# Patient Record
Sex: FEMALE | Race: WHITE | Employment: FULL TIME | URBAN - METROPOLITAN AREA
[De-identification: names, ages, dates, MRNs, and addresses within clinical notes are randomized per-mention and may not be internally consistent; named-entity substitution may affect disease eponyms.]

---

## 2021-04-06 ENCOUNTER — IMMUNIZATION (OUTPATIENT)
Dept: FAMILY MEDICINE | Facility: CLINIC | Age: 56
End: 2021-04-06
Payer: COMMERCIAL

## 2021-04-06 PROCEDURE — 0011A PR COVID VAC MODERNA 100 MCG/0.5 ML IM: CPT

## 2021-04-06 PROCEDURE — 91301 PR COVID VAC MODERNA 100 MCG/0.5 ML IM: CPT

## 2021-05-04 ENCOUNTER — IMMUNIZATION (OUTPATIENT)
Dept: FAMILY MEDICINE | Facility: CLINIC | Age: 56
End: 2021-05-04
Attending: FAMILY MEDICINE
Payer: COMMERCIAL

## 2021-05-04 PROCEDURE — 0012A PR COVID VAC MODERNA 100 MCG/0.5 ML IM: CPT

## 2021-05-04 PROCEDURE — 91301 PR COVID VAC MODERNA 100 MCG/0.5 ML IM: CPT

## 2024-03-22 ENCOUNTER — TRANSCRIBE ORDERS (OUTPATIENT)
Dept: OTHER | Age: 59
End: 2024-03-22

## 2024-03-22 DIAGNOSIS — Q25.1 ABDOMINAL AORTIC STENOSIS: Primary | ICD-10-CM

## 2024-03-25 DIAGNOSIS — I70.0 AORTIC ATHEROSCLEROSIS (H): Primary | ICD-10-CM

## 2024-03-27 ENCOUNTER — DOCUMENTATION ONLY (OUTPATIENT)
Dept: VASCULAR SURGERY | Facility: CLINIC | Age: 59
End: 2024-03-27
Payer: COMMERCIAL

## 2024-03-27 NOTE — PROGRESS NOTES
Action 3.27.24 jm   [facility]

## 2024-04-09 ENCOUNTER — TELEPHONE (OUTPATIENT)
Dept: VASCULAR SURGERY | Facility: CLINIC | Age: 59
End: 2024-04-09
Payer: COMMERCIAL

## 2024-04-09 DIAGNOSIS — I70.1 RENAL ARTERY STENOSIS (H): Primary | ICD-10-CM

## 2024-04-09 NOTE — TELEPHONE ENCOUNTER
----- Message from Patti Nunez RN sent at 4/3/2024  8:14 AM CDT -----  Hey guys    Can you please call this pt and cancel her CT that is scheduled prior to her appt with CP? She does not need a repeat CT scan as she recently had one done at an outside facility. Thanks!    Patti

## 2024-04-09 NOTE — TELEPHONE ENCOUNTER
Message left notifying pt that CT Scan scheduled on 4/22/24 is not needed and will be cancelled. The pt does not have Mycahrt.  Iggy Andrew on 4/9/2024 at 12:58 PM

## 2024-04-09 NOTE — TELEPHONE ENCOUNTER
The pt called back the CT Scan has been cancelled that was scheduled on 4/22/24 and the pt requested to be seen sooner on 4/22/24 and is now scheduled at 2:00 pm with Dr. Pandya.  Iggy Andrew on 4/9/2024 at 2:06 PM

## 2024-04-22 ENCOUNTER — OFFICE VISIT (OUTPATIENT)
Dept: VASCULAR SURGERY | Facility: CLINIC | Age: 59
End: 2024-04-22
Payer: COMMERCIAL

## 2024-04-22 VITALS — OXYGEN SATURATION: 97 % | SYSTOLIC BLOOD PRESSURE: 172 MMHG | DIASTOLIC BLOOD PRESSURE: 86 MMHG | HEART RATE: 89 BPM

## 2024-04-22 DIAGNOSIS — E11.9 TYPE 2 DIABETES MELLITUS WITHOUT COMPLICATION, WITH LONG-TERM CURRENT USE OF INSULIN (H): ICD-10-CM

## 2024-04-22 DIAGNOSIS — F17.200 CURRENT SMOKER: ICD-10-CM

## 2024-04-22 DIAGNOSIS — Z79.4 TYPE 2 DIABETES MELLITUS WITHOUT COMPLICATION, WITH LONG-TERM CURRENT USE OF INSULIN (H): ICD-10-CM

## 2024-04-22 DIAGNOSIS — Q25.1 ABDOMINAL AORTIC STENOSIS: Primary | ICD-10-CM

## 2024-04-22 DIAGNOSIS — I10 BENIGN ESSENTIAL HYPERTENSION: ICD-10-CM

## 2024-04-22 DIAGNOSIS — E78.5 HYPERLIPIDEMIA, UNSPECIFIED HYPERLIPIDEMIA TYPE: ICD-10-CM

## 2024-04-22 PROCEDURE — 99406 BEHAV CHNG SMOKING 3-10 MIN: CPT | Mod: GC | Performed by: SURGERY

## 2024-04-22 PROCEDURE — 99205 OFFICE O/P NEW HI 60 MIN: CPT | Mod: 25 | Performed by: SURGERY

## 2024-04-22 RX ORDER — FLUOCINOLONE ACETONIDE 0.11 MG/ML
OIL AURICULAR (OTIC)
COMMUNITY

## 2024-04-22 RX ORDER — METFORMIN HCL 500 MG
TABLET, EXTENDED RELEASE 24 HR ORAL
COMMUNITY

## 2024-04-22 RX ORDER — BLOOD SUGAR DIAGNOSTIC
STRIP MISCELLANEOUS
COMMUNITY
Start: 2024-01-12

## 2024-04-22 RX ORDER — LISINOPRIL 40 MG/1
TABLET ORAL
COMMUNITY

## 2024-04-22 RX ORDER — PEN NEEDLE, DIABETIC 32GX 5/32"
NEEDLE, DISPOSABLE MISCELLANEOUS
COMMUNITY
Start: 2024-02-26

## 2024-04-22 RX ORDER — TIOTROPIUM BROMIDE INHALATION SPRAY 3.12 UG/1
2 SPRAY, METERED RESPIRATORY (INHALATION)
COMMUNITY
Start: 2024-02-11

## 2024-04-22 RX ORDER — FOLIC ACID/MV,IRON,MIN/LUTEIN 0.4-18-25
1 TABLET ORAL DAILY
COMMUNITY

## 2024-04-22 RX ORDER — HYDROCORTISONE 2.5 %
CREAM (GRAM) TOPICAL
COMMUNITY
Start: 2023-02-13

## 2024-04-22 RX ORDER — ROSUVASTATIN CALCIUM 20 MG/1
TABLET, COATED ORAL
COMMUNITY

## 2024-04-22 RX ORDER — INSULIN GLARGINE 100 [IU]/ML
INJECTION, SOLUTION SUBCUTANEOUS
COMMUNITY

## 2024-04-22 RX ORDER — LIRAGLUTIDE 6 MG/ML
INJECTION SUBCUTANEOUS
COMMUNITY

## 2024-04-22 RX ORDER — ALBUTEROL SULFATE 90 UG/1
2 AEROSOL, METERED RESPIRATORY (INHALATION)
COMMUNITY
Start: 2024-04-16

## 2024-04-22 RX ORDER — NICOTINE 21 MG/24HR
PATCH, TRANSDERMAL 24 HOURS TRANSDERMAL
COMMUNITY

## 2024-04-22 NOTE — LETTER
April 25, 2024      Lynette Argueta  [address] [address]      Dear Ms. Garcia and [facility],    I hope this letter finds you well. I wanted to take a moment to express my sincere gratitude for referring Ms. Argueta to me and [facility] Vascular Surgery team. I had the pleasure of seeing her today for her paravisceral coral reef aortic plaque.  At this time I believe she is asymptomatic without signs of renovascular hypertension, mesenteric ischemia, or lower extremity ischemia.  As such, she would not benefit from extensive aortic surgery and I would favor continued optimal medical therapy. I have strongly advised smoking cessation, which she is open to.    Upon evaluation, our vascular care plan is below:    Diagnosis: Asymptomatic paravisceral coral reef aortic plaque  Treatment Plan: Continued optimal medical therapy and active surveillance, smoking cessation  Medication changes: None  Follow-up Care: 6 months return visit with me with a repeat CTA, exercise KRISTIAN, and renal duplex.    Please do not hesitate to reach out if you have any questions or require further information regarding this mutual patient. We would be happy to send over any additional supporting documentation for your records.    Thank you once again for entrusting us with the care of Ms. Argueta.    Best regards,    Chris Pandya MD, RPVI   Vascular & Endovascular Surgery  [facility]  Cell phone: [phone]  Email: [email or web address]

## 2024-04-22 NOTE — Clinical Note
4/22/2024       RE: Lynette Argueta  [address] [id number]     Dear Colleague,    Thank you for referring your patient, Lynette Argueta, to the [facility] [place] [facility]. Please see a copy of my visit note below.    VASCULAR SURGERY CLINIC CONSULTATION   VASCULAR SURGEON: Dr. Pandya    LOCATION:  [facility]ASC[facility] [place]    Lynette Argueta  Medical Record #:  [id number]  YOB: 1965  Age:  58 year old     Date of Service: 4/22/2024    PRIMARY CARE PROVIDER: [place], Efraín Patel      Reason for visit:  Pararenal aortic stenosis    IMPRESSION & RECOMMENDATION:      #1 Asymptomatic high-grade abdominal aortic stenosis at the level of renal arteries extending distally 2 cm  #2 Well-controlled hypertension on single-agent antihypertensive regiment (lisinopril 40 mg)  #3 Active smoker with COPD, not on home O2  #4 Suboptimally controlled type 2 diabetes mellitus (Hgb A1C 7.3%)  #5 Hyperlipidemia  #6 Bilateral hip pain, likely arthritic hip pain    Ms. Argueta is a 58-year-old female seen in consultation today for second opinion regarding her high-grade abdominal aortic stenosis with coral reef plaque at the level of the renal arteries extending distally approximately 2 cm.  She appears to be largely asymptomatic from this as she is no evidence of endorgan malperfusion. She has no lower extremity rest pain, no buttock, thigh, or calf claudication, palpable pedal pulses.  Plaque about the level of the renal arteries however she does have normal kidney function and would not appear she has any significant component of renovascular hypertension as her blood pressure is well-controlled with a single antihypertensive agent.    We discussed the natural history of aortoocclusive disease and peripheral arterial disease and general approach to treatment.  Namely, optimal surgical  treatment of her abdominal aortic stenosis would require open surgical aortic endarterectomy. We discussed with the patient and her daughter that at the present time there is no indication for revascularization is she would appear to be asymptomatic.  He cannot say for certain, her description of pain with walking at the lateral hip joints bilaterally without buttock or thigh pain with appear most consistent with arthritic hip pain rather than buttock claudication.    We will plan for routine surveillance with a repeat CT angiogram abdomen pelvis approximately 6 months after the initial scan, placing her follow-up date in August 2024. Exercise ABIs as well as renal duplex ultrasound will also be completed. In the meantime she will continue to work towards optimal medical therapy with control of her diabetes, hypertension, continued statin and aspirin therapy. She understands she needs to quit smoking but remains pre-contemplative.    Plan  -Follow-up in August 2024, 6 months after initial CT scan.  -Prior to the appointment she will have a repeat CTA abdomen pelvis at the imaging center near her home.  Exercise ABIs and renal duplex ultrasound will be completed at the [facility] the day of her appointment.    -Continue aspirin, high-dose statin therapy     Patient was seen and discussed with staff, Dr. Pandya.    Ted Hutson MD      HPI:  Lynette Argueta is a 58 year old female who was seen today in consultation for aortic stenosis and is accompanied by her daughter who works in CT imaging. Ms. Argueta is a current smoker with COPD, hypertension, hyperlipidemia, suboptimally controlled type 2 diabetes, who was seen by an outside vascular surgeon regarding her abdominal aortic stenosis.  She tells us this segment of aortic stenosis was found incidentally on a routine chest CT for lung cancer screening several years ago.  She was eventually referred to vascular surgeon and underwent evaluation by Dr. Gallagher in  February of this year.  On review of Dr. Gallagher's clinic documentation, Ms. Argueta at that time denied any rest pain, lower extremity claudication, lower extremity wounds.  Today she confirms that she is generally without lower extremity pain however that she does have bilateral hip pain, mostly around the lateral hip joints, and on further elaboration she has no buttock claudication, thigh claudication or calf claudication.  She has no respiratory complaints and has no history of flash pulmonary edema.  Hypertension is well-controlled on 1 oral antihypertensive medication (lisinopril 40 mg) and has normal renal function. Hip pain does limit her ambulation and she feels she has been walking her dog less because of this.  She works in the mental health field.    REVIEW OF SYSTEMS:    A 12 point ROS was reviewed and except for what is listed in the HPI above, all others are negative    PHH:   Medical history:  COPD  Type 2 diabetes  Hypertension  Tobacco dependence and active smoking  Hyperlipidemia      ALLERGIES:    Allergies   Allergen Reactions    Chlorthalidone Dizziness    Pioglitazone Dizziness       MEDS:    Current Outpatient Medications:     ACCU-CHEK GUIDE test strip, Test 2 times per day, Disp: , Rfl:     albuterol (PROAIR HFA/PROVENTIL HFA/VENTOLIN HFA) 108 (90 Base) MCG/ACT inhaler, Inhale 2 puffs into the lungs, Disp: , Rfl:     BD BRITTANY U/F 32G X 4 MM insulin pen needle, USE 2 PER DAY. FOR ADMINISTERING VICTOZA AND LANTUS, DAILY*, Disp: , Rfl:     hydrocortisone 2.5 % cream, Apply topically to rash in ears twice daily until rash resolves. Then use as needed for flares., Disp: , Rfl:     SPIRIVA RESPIMAT 2.5 MCG/ACT inhaler, Inhale 2 puffs into the lungs, Disp: , Rfl:     CERTAVITE/ANTIOXIDANTS tablet, Take 1 tablet by mouth daily, Disp: , Rfl:     fluocinolone acetonide oil 0.01 % ear drops, Place 5 Drops into both ears two times daily*, Disp: , Rfl:     LANTUS SOLOSTAR 100 UNIT/ML soln, Inject 24 units  subcutaneous before bedtime.*, Disp: , Rfl:     lisinopril (ZESTRIL) 40 MG tablet, Take 1 Tablet (40 mg) by mouth once daily.*, Disp: , Rfl:     metFORMIN (GLUCOPHAGE XR) 500 MG 24 hr tablet, Take 4 Tablets (2,000 mg) by mouth once daily.*, Disp: , Rfl:     nicotine (NICODERM CQ) 14 MG/24HR 24 hr patch, APPLY 1-2 PATCHES TO CLEAN, DRY SKIN AND CHANGE DAILY*, Disp: , Rfl:     rosuvastatin (CRESTOR) 20 MG tablet, Take 1 Tablet (20 mg) by mouth once daily. Replaces simvastatin.*, Disp: , Rfl:     VICTOZA PEN 18 MG/3ML soln, Inject 0.3 mL (1.8 mg) subcutaneous once daily.*, Disp: , Rfl:     SOCIAL HABITS:   Social History    Substance and Sexual Activity      Alcohol use: Not on file      History   Drug Use Not on file      History   Smoking Status    Not on file   Smokeless Tobacco    Not on file        FAMILY HISTORY:  No family history on file.    PE:  BP (!) 172/86 (BP Location: Right arm, Patient Position: Sitting, Cuff Size: Adult Regular)   Pulse 89   SpO2 97%   Wt Readings from Last 1 Encounters:   No data found for Wt     There is no height or weight on file to calculate BMI.    EXAM:  Gen: no acute distress, sitting comfortably in exam chair  HEENT: Atraumatic, normocephalic  Neuro: Alert and oriented. No gross neurologic deficits   Pulm: nonlabored breathing on room air, no cough  CV: RRR by radial pulse, noncyanotic   ABD:soft, nontender, nondistended  MSK:  Normal active range of motion, no edema  Skin: Warm, dry, no rashes or abrasions on exposed skin  Psych: Normal affect, cooperative  Pulses: Palpable bilateral posterior tibial and dorsalis pedis pulses    DIAGNOSTIC STUDIES:     Images:  CT angiogram of the abdomen pelvis performed in [place] at [facility] on 2/12/2024 was reviewed.  Imaging is available in PACS as is the formal radiology report.  On my review she has approximately 70% stenosis with coral reef plaque of the aorta at the level of the renal arteries extending distally  approximately 2 cm.  Iliac system is widely patent bilaterally.    LABS:      Labs performed January 12, 2024  Hemoglobin A1c: 7.3%  Creatinine: 0.67  eGFR: >90    LDL 39, triglycerides 191 - 6/16/2023        Again, thank you for allowing me to participate in the care of your patient.      Sincerely,    Chris Pandya MD

## 2024-04-22 NOTE — PATIENT INSTRUCTIONS
Thank you so much for choosing us for your care. It was a pleasure to see you at the vascular clinic today.     Follow-up recommendations: We will see you back in 6 months virtually. Please do not hesitate to reach out to us in the meantime with any concerns. Our schedulers will get in touch with you to set up your follow-up visit.    Additional testing/imaging ordered today: CTA and renal US in 6 months (can be done near your home).      Our scheduling team will get in touch with you to set up any follow-up testing/imaging and/or appointments. Please be aware that any testing/imaging recommended today will need to completed prior to your next visit with the provider. If testing/imaging is not completed prior to your next visit, your visit may be rescheduled.     If you have any questions, please contact our clinic directly at [phone] and ask for the nurse. We also encourage the use of [email or web address] to communicate with your healthcare provider.    If you have an urgent need after business hours (8:00 am to 4:30 pm) please call [phone], option 4, and ask for the vascular attending on call. For non-urgent after hours needs, please call the vascular clinic at [phone]. For scheduling needs, please call our clinic directly at [phone].    =====================================================================    [facility] is recognized by the [facility] as a comprehensive stroke center. As part of our commitment to better patient outcomes and excellent stroke education, we attach the below stroke education materials to ALL of the after visit summaries in our vascular clinic.        Learning About BE FAST: Stroke Warning Signs  BE FAST is a simple way to remember the main symptoms of stroke. These symptoms happen suddenly. So learning what to look for helps you know when to call for medical help. BE FAST stands for:    B - Balance.  Loss of balance or trouble walking.     E -  Eyes.  Trouble seeing out of one or both eyes.     F - Face.  Weakness or drooping on one side of the face.     A - Arm.  Weakness or numbness in an arm or leg.     S - Speech.  Trouble speaking.     T - Time to call 911.  Also call 911 if you have other stroke symptoms. They include:  Sudden confusion.  Sudden trouble understanding simple statements.  Fainting.  A seizure.  A sudden, severe headache.     A stroke happens when a blood vessel in the brain bursts or is blocked by a blood clot. The blood supply to part of the brain--and the oxygen the blood carries--is reduced. This damages the brain.   If you have a stroke, quick treatment may save your life. And it may reduce the damage in your brain so that you have fewer problems after the stroke.   Current as of: December 18, 2022               Content Version: 13.8    4196-9510 [employer].   Care instructions adapted under license by your healthcare professional. If you have questions about a medical condition or this instruction, always ask your healthcare professional. [employer] disclaims any warranty or liability for your use of this information.    Learning About How to Prevent a Stroke  What is a stroke?  A stroke is damage to the brain that occurs when a blood vessel in the brain bursts or is blocked by a blood clot. Without blood and the oxygen it carries, part of the brain starts to die. The part of the body controlled by the damaged area of the brain can't work properly.  Brain damage can start within minutes of a stroke. But quick treatment can help limit the damage and increase the chance of a full recovery.  What puts you at risk for stroke?  A risk factor is anything that makes you more likely to have a particular health problem.  Risk factors for stroke that you can manage or change include:  Health problems like atrial fibrillation, diabetes, high blood pressure, high cholesterol, hardening of the arteries  (atherosclerosis), and sickle cell disease.  Smoking.  Drinking more than 2 alcoholic drinks a day for men and 1 drink a day for women.  Being overweight.  Not eating healthy foods.  Not getting enough physical activity.  Risk factors you can't change include:  Having a previous stroke.  Family history of stroke.  Being older.  Being , [place] Native, , or South  American.  Being female.  Having certain problems during pregnancy, such as preeclampsia.  Being past menopause.  Your doctor can help you know your risk. Then you and your doctor can talk about whether to take steps to lower it.  How can you help prevent a stroke?  Here are some things you can do to help prevent a stroke.  Manage health problems that raise your risk. These include atrial fibrillation, diabetes, high blood pressure, and high cholesterol.  Have a heart-healthy lifestyle.  Don't smoke. If you need help quitting, talk to your doctor about stop-smoking programs and medicines. These can increase your chances of quitting for good.  Limit alcohol to 2 drinks a day for men and 1 drink a day for women.  Stay at a healthy weight. Lose weight if you need to.  Be active. Get at least 30 minutes of exercise on most days of the week. Walking is a good choice. You also may want to do other activities, such as running, swimming, cycling, or playing tennis or team sports.  Eat heart-healthy foods. These include vegetables, fruits, nuts, beans, lean meat, fish, and whole grains. Limit sodium and sugar.  If you think you may have a problem with alcohol or drug use, talk to your doctor.  If you use hormone therapy for menopause or hormonal birth control, talk with your doctor. Ask if these are right for you. They may raise the risk of stroke in some people.  Decide with your doctor whether you will also take medicines to help lower your risk. For example, you and your doctor may decide you will take a medicine that prevents  blood clots.  What are the symptoms of a stroke?  Symptoms of a stroke happen quickly. A stroke may cause:  Sudden numbness, tingling, weakness, or loss of movement in your face, arm, or leg, especially on only one side of your body.  Sudden vision changes.  Sudden trouble speaking.  Sudden confusion or trouble understanding simple statements.  Sudden problems with walking or balance.  A sudden, severe headache that is different from past headaches.  Fainting.  A seizure.  It's important to call for medical help if you have stroke symptoms. Quick treatment may save your life. And it may reduce the damage in your brain so that you have fewer problems after the stroke.  Follow-up care is a key part of your treatment and safety. Be sure to make and go to all appointments, and call your doctor if you are having problems. It's also a good idea to know your test results and keep a list of the medicines you take.    Current as of: December 18, 2022               Content Version: 13.8    [id number] [employer].   Care instructions adapted under license by your healthcare professional. If you have questions about a medical condition or this instruction, always ask your healthcare professional. [employer] disclaims any warranty or liability for your use of this information.

## 2024-04-22 NOTE — PROGRESS NOTES
VASCULAR SURGERY CLINIC CONSULTATION   VASCULAR SURGEON: Dr. Pandya    LOCATION:  [facility]    Lynette Argueta  Medical Record #:  [id number]  YOB: 1965  Age:  58 year old     Date of Service: 4/22/2024    PRIMARY CARE PROVIDER: Alton, Efraín Patel      Reason for visit:  Pararenal aortic stenosis    IMPRESSION & RECOMMENDATION:      #1 Asymptomatic high-grade abdominal aortic stenosis at the level of renal arteries extending distally 2 cm  #2 Well-controlled hypertension on single-agent antihypertensive regiment (lisinopril 40 mg)  #3 Active smoker with COPD, not on home O2  #4 Suboptimally controlled type 2 diabetes mellitus (Hgb A1C 7.3%)  #5 Hyperlipidemia  #6 Bilateral hip pain, likely arthritic hip pain    Ms. Argueta is a 58-year-old female seen in consultation today for second opinion regarding her high-grade abdominal aortic stenosis with coral reef plaque at the level of the renal arteries extending distally approximately 2 cm.  She appears to be largely asymptomatic from this as she is no evidence of endorgan malperfusion. She has no lower extremity rest pain, no buttock, thigh, or calf claudication, palpable pedal pulses.  Plaque about the level of the renal arteries however she does have normal kidney function and would not appear she has any significant component of renovascular hypertension as her blood pressure is well-controlled with a single antihypertensive agent.    We discussed the natural history of aortoocclusive disease and peripheral arterial disease and general approach to treatment.  Namely, optimal surgical treatment of her abdominal aortic stenosis would require open surgical aortic endarterectomy. We discussed with the patient and her daughter that at the present time there is no indication for revascularization is she would appear to be asymptomatic.  He cannot say for certain, her description of pain with walking at the  lateral hip joints bilaterally without buttock or thigh pain with appear most consistent with arthritic hip pain rather than buttock claudication.    We will plan for routine surveillance with a repeat CT angiogram abdomen pelvis approximately 6 months after the initial scan, placing her follow-up date in August 2024. Exercise ABIs as well as renal duplex ultrasound will also be completed. In the meantime she will continue to work towards optimal medical therapy with control of her diabetes, hypertension, continued statin and aspirin therapy. She understands she needs to quit smoking but remains pre-contemplative.    Plan  -Follow-up in August 2024, 6 months after initial CT scan.  -Prior to the appointment she will have a repeat CTA abdomen pelvis at the imaging center near her home.  Exercise ABIs and renal duplex ultrasound will be completed at the [facility] the day of her appointment.    -Continue aspirin, high-dose statin therapy     Patient was seen and discussed with staff, Dr. Pandya.    Ted Hutson MD      HPI:  Lynette Argueta is a 58 year old female who was seen today in consultation for aortic stenosis and is accompanied by her daughter who works in CT imaging. Ms. Argueta is a current smoker with COPD, hypertension, hyperlipidemia, suboptimally controlled type 2 diabetes, who was seen by an outside vascular surgeon regarding her abdominal aortic stenosis.  She tells us this segment of aortic stenosis was found incidentally on a routine chest CT for lung cancer screening several years ago.  She was eventually referred to vascular surgeon and underwent evaluation by Dr. Gallagher in February of this year.  On review of Dr. Gallagher's clinic documentation, Ms. Argueta at that time denied any rest pain, lower extremity claudication, lower extremity wounds.  Today she confirms that she is generally without lower extremity pain however that she does have bilateral hip pain, mostly around the lateral hip joints,  and on further elaboration she has no buttock claudication, thigh claudication or calf claudication.  She has no respiratory complaints and has no history of flash pulmonary edema.  Hypertension is well-controlled on 1 oral antihypertensive medication (lisinopril 40 mg) and has normal renal function. Hip pain does limit her ambulation and she feels she has been walking her dog less because of this.  She works in the mental health field.    REVIEW OF SYSTEMS:    A 12 point ROS was reviewed and except for what is listed in the HPI above, all others are negative    PHH:   Medical history:  COPD  Type 2 diabetes  Hypertension  Tobacco dependence and active smoking  Hyperlipidemia      ALLERGIES:    Allergies   Allergen Reactions    Chlorthalidone Dizziness    Pioglitazone Dizziness       MEDS:    Current Outpatient Medications:     ACCU-CHEK GUIDE test strip, Test 2 times per day, Disp: , Rfl:     albuterol (PROAIR HFA/PROVENTIL HFA/VENTOLIN HFA) 108 (90 Base) MCG/ACT inhaler, Inhale 2 puffs into the lungs, Disp: , Rfl:     BD BRITTANY U/F 32G X 4 MM insulin pen needle, USE 2 PER DAY. FOR ADMINISTERING VICTOZA AND LANTUS, DAILY*, Disp: , Rfl:     hydrocortisone 2.5 % cream, Apply topically to rash in ears twice daily until rash resolves. Then use as needed for flares., Disp: , Rfl:     SPIRIVA RESPIMAT 2.5 MCG/ACT inhaler, Inhale 2 puffs into the lungs, Disp: , Rfl:     CERTAVITE/ANTIOXIDANTS tablet, Take 1 tablet by mouth daily, Disp: , Rfl:     fluocinolone acetonide oil 0.01 % ear drops, Place 5 Drops into both ears two times daily*, Disp: , Rfl:     LANTUS SOLOSTAR 100 UNIT/ML soln, Inject 24 units subcutaneous before bedtime.*, Disp: , Rfl:     lisinopril (ZESTRIL) 40 MG tablet, Take 1 Tablet (40 mg) by mouth once daily.*, Disp: , Rfl:     metFORMIN (GLUCOPHAGE XR) 500 MG 24 hr tablet, Take 4 Tablets (2,000 mg) by mouth once daily.*, Disp: , Rfl:     nicotine (NICODERM CQ) 14 MG/24HR 24 hr patch, APPLY 1-2 PATCHES TO  CLEAN, DRY SKIN AND CHANGE DAILY*, Disp: , Rfl:     rosuvastatin (CRESTOR) 20 MG tablet, Take 1 Tablet (20 mg) by mouth once daily. Replaces simvastatin.*, Disp: , Rfl:     VICTOZA PEN 18 MG/3ML soln, Inject 0.3 mL (1.8 mg) subcutaneous once daily.*, Disp: , Rfl:     SOCIAL HABITS:   Social History    Substance and Sexual Activity      Alcohol use: Not on file      History   Drug Use Not on file      History   Smoking Status    Not on file   Smokeless Tobacco    Not on file        FAMILY HISTORY:  No family history on file.    PE:  BP (!) 172/86 (BP Location: Right arm, Patient Position: Sitting, Cuff Size: Adult Regular)   Pulse 89   SpO2 97%   Wt Readings from Last 1 Encounters:   No data found for Wt     There is no height or weight on file to calculate BMI.    EXAM:  Gen: no acute distress, sitting comfortably in exam chair  HEENT: Atraumatic, normocephalic  Neuro: Alert and oriented. No gross neurologic deficits   Pulm: nonlabored breathing on room air, no cough  CV: RRR by radial pulse, noncyanotic   ABD:soft, nontender, nondistended  MSK:  Normal active range of motion, no edema  Skin: Warm, dry, no rashes or abrasions on exposed skin  Psych: Normal affect, cooperative  Pulses: Palpable bilateral posterior tibial and dorsalis pedis pulses    DIAGNOSTIC STUDIES:     Images:  CT angiogram of the abdomen pelvis [facility] [place] [facility]12/[facility]24 was reviewed.  Imaging is available in PACS as is the formal radiology report.  On my review she has approximately 70% stenosis with coral reef plaque of the aorta at the level of the renal arteries extending distally approximately 2 cm.  Iliac system is widely patent bilaterally.    LABS:      Labs performed January 12, 2024  Hemoglobin A1c: 7.3%  Creatinine: 0.67  eGFR: >90    LDL 39, triglycerides 191 - 6/16/2023

## 2024-04-25 RX ORDER — ASPIRIN 81 MG/1
81 TABLET ORAL DAILY
COMMUNITY

## 2024-07-27 ENCOUNTER — TELEPHONE (OUTPATIENT)
Dept: VASCULAR SURGERY | Facility: CLINIC | Age: 59
End: 2024-07-27
Payer: COMMERCIAL

## 2024-07-27 NOTE — TELEPHONE ENCOUNTER
Left Voicemail (1st Attempt) and Sent Artilleryt (1st Attempt) for the patient to call back and schedule the followin Month Follow up Pre-transplant evaluation for kidney transplant.    Location: [facility]  Provider:     Appointment type: Return Vascular Patient    Appointment mode: In Person visit   Return date: 2024    Specialty phone number: [phone] or  [phone]    Is Imaging Needed: Renal Complete US & CTA Scan  Imaging Phone Number: [phone]    Additional Notes: Please schedule imaging prior to Provider visit.  Iggy Andrew on 2024 at 4:39 PM

## 2024-08-03 NOTE — TELEPHONE ENCOUNTER
Left Voicemail (2nd Attempt) for the patient to call back and schedule the followin Month Follow up Pre-transplant evaluation for kidney transplant.    Location: [facility]  Provider:     Appointment type: Return Vascular Patient    Appointment mode: In Person visit   Return date: 2024    Specialty phone number: [phone] or  [phone]    Is Imaging Needed: Renal Complete US & CTA Scan  Imaging Phone Number: [phone]    Additional Notes: Please schedule imaging prior to Provider visit  Iggy Andrew on 8/3/2024 at 2:06 PM

## 2024-08-19 NOTE — TELEPHONE ENCOUNTER
The patient called back and scheduled the followin Month Follow up Pre-transplant evaluation for kidney transplant.    Location: [facility]  Provider:     Appointment type: Return Vascular Patient    Appointment mode: In Person visit   Return date: 2024    Specialty phone number: [phone] or  [phone]    Is Imaging Needed: Renal Complete US & CTA Scan  Imaging Phone Number: [phone]    Additional Notes: The pt is scheduled for a video visit on 10/30/24 with  the pt is requesting that the imaging request be faxed to   [facility] Phone number [phone] Andrew on 2024 at 2:02 PM

## 2024-08-23 NOTE — TELEPHONE ENCOUNTER
I leave the pt a voice stating that the provider is requesting imaging be done at a [facility] location.  Iggy Andrew on 8/23/2024 at 2:55 PM

## 2024-10-07 ENCOUNTER — ANCILLARY PROCEDURE (OUTPATIENT)
Dept: CT IMAGING | Facility: CLINIC | Age: 59
End: 2024-10-07
Attending: SURGERY
Payer: COMMERCIAL

## 2024-10-07 DIAGNOSIS — Q25.1 ABDOMINAL AORTIC STENOSIS: ICD-10-CM

## 2024-10-07 LAB
CREAT BLD-MCNC: 0.7 MG/DL (ref 0.5–1)
EGFRCR SERPLBLD CKD-EPI 2021: >60 ML/MIN/1.73M2

## 2024-10-07 PROCEDURE — 82565 ASSAY OF CREATININE: CPT

## 2024-10-07 PROCEDURE — 74174 CTA ABD&PLVS W/CONTRAST: CPT | Mod: TC | Performed by: STUDENT IN AN ORGANIZED HEALTH CARE EDUCATION/TRAINING PROGRAM

## 2024-10-07 PROCEDURE — 71275 CT ANGIOGRAPHY CHEST: CPT | Mod: TC | Performed by: STUDENT IN AN ORGANIZED HEALTH CARE EDUCATION/TRAINING PROGRAM

## 2024-10-07 RX ORDER — IOPAMIDOL 755 MG/ML
72 INJECTION, SOLUTION INTRAVASCULAR ONCE
Status: COMPLETED | OUTPATIENT
Start: 2024-10-07 | End: 2024-10-07

## 2024-10-07 RX ADMIN — IOPAMIDOL 72 ML: 755 INJECTION, SOLUTION INTRAVASCULAR at 13:15

## 2024-10-30 ENCOUNTER — VIRTUAL VISIT (OUTPATIENT)
Dept: VASCULAR SURGERY | Facility: CLINIC | Age: 59
End: 2024-10-30
Payer: COMMERCIAL

## 2024-10-30 VITALS — BODY MASS INDEX: 26.4 KG/M2 | WEIGHT: 149 LBS | HEIGHT: 63 IN

## 2024-10-30 DIAGNOSIS — Z79.4 TYPE 2 DIABETES MELLITUS WITHOUT COMPLICATION, WITH LONG-TERM CURRENT USE OF INSULIN (H): ICD-10-CM

## 2024-10-30 DIAGNOSIS — I70.0 AORTIC ATHEROSCLEROSIS (H): ICD-10-CM

## 2024-10-30 DIAGNOSIS — E11.9 TYPE 2 DIABETES MELLITUS WITHOUT COMPLICATION, WITH LONG-TERM CURRENT USE OF INSULIN (H): ICD-10-CM

## 2024-10-30 DIAGNOSIS — I10 BENIGN ESSENTIAL HYPERTENSION: ICD-10-CM

## 2024-10-30 DIAGNOSIS — F17.200 CURRENT SMOKER: ICD-10-CM

## 2024-10-30 DIAGNOSIS — Q25.1 ABDOMINAL AORTIC STENOSIS: Primary | ICD-10-CM

## 2024-10-30 DIAGNOSIS — E78.5 HYPERLIPIDEMIA, UNSPECIFIED HYPERLIPIDEMIA TYPE: ICD-10-CM

## 2024-10-30 DIAGNOSIS — I70.1 RENAL ARTERY STENOSIS (H): ICD-10-CM

## 2024-10-30 PROCEDURE — 99406 BEHAV CHNG SMOKING 3-10 MIN: CPT | Mod: 95 | Performed by: SURGERY

## 2024-10-30 PROCEDURE — 99213 OFFICE O/P EST LOW 20 MIN: CPT | Mod: 95 | Performed by: SURGERY

## 2024-10-30 RX ORDER — SEMAGLUTIDE 0.68 MG/ML
0.25 INJECTION, SOLUTION SUBCUTANEOUS
COMMUNITY

## 2024-10-30 ASSESSMENT — PAIN SCALES - GENERAL: PAINLEVEL_OUTOF10: NO PAIN (0)

## 2024-10-30 NOTE — PROGRESS NOTES
Vascular & Endovascular Surgery Clinic Return Visit   Vascular Surgeon: Chris Pandya MD, RPVI      Location:  Virtual Visit Details:    Type of service:  Video     Length of call: 10 minutes    Originating Location (Pt. Location): Home     Distant Location (Provider location):  [facility]     Platform used for visit:  Hema     Received verbal consent for virtual visit.     Lynette Argueta  Medical Record #:  [id number]  YOB: 1965  Age:  59 year old     Date of Service: 10/30/2024    Primary Care Provider: Alton, Efraín Patel    Chief Complaint/Reason for Visit: Follow up of paravisceral aortic coral reef plaque     Interval History:  Ms. Argueta is a pleasant 59 year old female who returns today with her friend Poornima portillo for follow up of her aortic plaque.  She remains well clinically with no major hospitalizations.  She tells me she is walking ok.  She has no overt claudication or muscle issues.  She does have persistent hip pain and this weekend had some issues with her knee.  She did have one elevated BP reading at the dentist recently and is seeing her PCP this week.  She is on a single agent with much room to titrate her medical regimen.       Review of Systems:    A 12 point ROS was reviewed and is negative except for symptoms noted in HPI.     Medical/Surgical History:  No past medical history on file.    No past surgical history on file.     Allergies:     Allergies   Allergen Reactions    Chlorthalidone Dizziness    Pioglitazone Dizziness        Medications:    Current Outpatient Medications   Medication Sig Dispense Refill    ACCU-CHEK GUIDE test strip Test 2 times per day      albuterol (PROAIR HFA/PROVENTIL HFA/VENTOLIN HFA) 108 (90 Base) MCG/ACT inhaler Inhale 2 puffs into the lungs      aspirin 81 MG EC tablet Take 81 mg by mouth daily      BD BRITTANY U/F 32G X 4 MM insulin pen needle USE 2 PER DAY. FOR ADMINISTERING  "VICTOZA AND LANTUS, DAILY*      CERTAVITE/ANTIOXIDANTS tablet Take 1 tablet by mouth daily      fluocinolone acetonide oil 0.01 % ear drops Place 5 Drops into both ears two times daily*      hydrocortisone 2.5 % cream Apply topically to rash in ears twice daily until rash resolves. Then use as needed for flares.      LANTUS SOLOSTAR 100 UNIT/ML soln Inject 24 units subcutaneous before bedtime.*      lisinopril (ZESTRIL) 40 MG tablet Take 1 Tablet (40 mg) by mouth once daily.*      metFORMIN (GLUCOPHAGE XR) 500 MG 24 hr tablet Take 4 Tablets (2,000 mg) by mouth once daily.*      nicotine (NICODERM CQ) 14 MG/24HR 24 hr patch APPLY 1-2 PATCHES TO CLEAN, DRY SKIN AND CHANGE DAILY*      OZEMPIC, 0.25 OR 0.5 MG/DOSE, 2 MG/3ML pen Inject 0.25 mg subcutaneously every 7 days.      rosuvastatin (CRESTOR) 20 MG tablet Take 1 Tablet (20 mg) by mouth once daily. Replaces simvastatin.*      SPIRIVA RESPIMAT 2.5 MCG/ACT inhaler Inhale 2 puffs into the lungs      VICTOZA PEN 18 MG/3ML soln Inject 0.3 mL (1.8 mg) subcutaneous once daily.*       No current facility-administered medications for this visit.        Social Habits:    Tobacco Use      Smoking status: Every Day        Types: Cigarettes        Passive exposure: Current (per pt)      Smokeless tobacco: Never       Alcohol Use: Not on file       History   Drug Use Unknown        Family History:  No family history on file.    Physical Examination:  Ht 1.6 m (5' 3\")   Wt 67.6 kg (149 lb)   BMI 26.39 kg/m    Wt Readings from Last 1 Encounters:   10/30/24 67.6 kg (149 lb)     Body mass index is 26.39 kg/m .    Exam:  No full examination as this was a video visit however,   General: No apparent distress. Answers questions appropriately with no evidence of neurologic deficit on limited video.  Neurologic: move all extremities equally  Eyes: no obvious scleral abnormality  Skin: No exposed skin lesions on face or hands    Laboratory Findings:  Creatinine POCT   Date Value Ref " Range Status   10/07/2024 0.7 0.5 - 1.0 mg/dL Final         Imaging:    I personally reviewed the CTA from 10/7/2024 and compared to that from 2/12/2024 which shows stable coral reef aortic plaque with stable luminal stenosis with minimal involvement of the right renal artery.      Impression/Shared Medical Decision Making:    #1- Asymptomatic paravisceral coral reef aortic plaque  #2- Hypertension, well-controlled on single-agent antihypertensive regiment (lisinopril 40 mg)  #3- COPD, not on home O2  #4- Type 2 diabetes mellitus, Hgb A1C 7.3% 1/12/2024  #5- Hyperlipidemia  #6- Bilateral hip pain, unclearly etiology however, likely arthritic hip pain  #7- Nicotine dependence, current smoker  Ms. Argueta is a pleasant 59 year old female seen for 6 month follow up of an atherosclerotic aortic plaque.   She has remained without lower extremity ischemia or renovascular hypertension.  She is on a single agent at this time.  We will continue to monitor for signs of ischemia.  Currently she has no indication for surgery.     I spent  3 minutes  today discussing the patients current cigarette dependence; the effects of smoking on their medical conditions and family; and a counseling plan for quitting. Part of our discussion included  The patient was agreeable to smoking cessation options. We also discussed risks of smoking, benefits of quitting, and total abstinence. The vascular group will follow up at the next visit to check the progress.    Ms. Argueta and her friend are in agreement with this plan as outlined.    Recommendations/Plan:  She remains asymptomatic and continues to have no indication for surgical intervention  We will see her back in 1 year with a repeat CTA  Recommend continuing optimal medical therapy with   Aspirin  High does statin therapy  Smoking cessation  Antihypertensives, well controlled on single agent     Chris Pandya MD  Vascular & Endovascular Surgery      15 minutes spent on the day  of encounter doing chart review from our system and care everywhere, history and exam, documentation, coordinating care, and further activities as noted with over half spent counseling.

## 2024-10-30 NOTE — PATIENT INSTRUCTIONS
Thank you so much for choosing us for your care. It was a pleasure to see you at the vascular clinic today.     Follow-up recommendations: We will see you back in 1 year. Please do not hesitate to reach out to us in the meantime with any concerns. Our schedulers will get in touch with you to set up your follow-up visit.    Additional testing/imaging ordered today: Repeat CT scan in 1 year.      Our scheduling team will get in touch with you to set up any follow-up testing/imaging and/or appointments. Please be aware that any testing/imaging recommended today will need to completed prior to your next visit with the provider. If testing/imaging is not completed prior to your next visit, your visit may be rescheduled.     If you have any questions, please contact our clinic directly at [phone] and ask for the nurse. We also encourage the use of MUJIN to communicate with your healthcare provider.    If you have an urgent need after business hours (8:00 am to 4:30 pm) please call [phone], option 4, and ask for the vascular attending on call. For non-urgent after hours needs, please call the vascular clinic at [phone]. For scheduling needs, please call our clinic directly at [phone].

## 2024-10-30 NOTE — NURSING NOTE
Current patient location: At work per pt    Is the patient currently in the state of [place]? YES    Visit mode:VIDEO    If the visit is dropped, the patient can be reconnected by: VIDEO VISIT: Text to cell phone:   Telephone Information:   Mobile [phone]       Will anyone else be joining the visit? Yes, Pt's coworker Poornima  is with the pt and will be joining the video visit per pt.    (If patient encounters technical issues they should call [phone]    Are changes needed to the allergy or medication list? Pt stated no med changes    Are refills needed on medications prescribed by this physician? NO    Rooming Documentation:  Not applicable    Reason for visit: RECHARSENIO Zuleta [facility]

## 2024-10-30 NOTE — LETTER
10/30/2024       RE: Lynette Argueta  [address] [facility] [id number]     Dear Colleague,    Thank you for referring your patient, Lynette Argueta, to the [facility] [place] at [facility]. Please see a copy of my visit note below.    Vascular & Endovascular Surgery Clinic Return Visit   Vascular Surgeon: Chris Pandya MD, RPVI      Location:  Virtual Visit Details:    Type of service:  Video     Length of call: 10 minutes    Originating Location (Pt. Location): Home     Distant Location (Provider location):  [facility]     Platform used for visit:  [employer]     Received verbal consent for virtual visit.     Lynette Argueta  Medical Record #:  [id number]  YOB: 1965  Age:  59 year old     Date of Service: 10/30/2024    Primary Care Provider: Alton, Efraín Patel    Chief Complaint/Reason for Visit: Follow up of paravisceral aortic coral reef plaque     Interval History:  Ms. Argueta is a pleasant 59 year old female who returns today with her friend Poornima portillo for follow up of her aortic plaque.  She remains well clinically with no major hospitalizations.  She tells me she is walking ok.  She has no overt claudication or muscle issues.  She does have persistent hip pain and this weekend had some issues with her knee.  She did have one elevated BP reading at the dentist recently and is seeing her PCP this week.  She is on a single agent with much room to titrate her medical regimen.       Review of Systems:    A 12 point ROS was reviewed and is negative except for symptoms noted in HPI.     Medical/Surgical History:  No past medical history on file.    No past surgical history on file.     Allergies:     Allergies   Allergen Reactions     Chlorthalidone Dizziness     Pioglitazone Dizziness        Medications:    Current Outpatient Medications   Medication Sig  "Dispense Refill     ACCU-CHEK GUIDE test strip Test 2 times per day       albuterol (PROAIR HFA/PROVENTIL HFA/VENTOLIN HFA) 108 (90 Base) MCG/ACT inhaler Inhale 2 puffs into the lungs       aspirin 81 MG EC tablet Take 81 mg by mouth daily       BD BRITTANY U/F 32G X 4 MM insulin pen needle USE 2 PER DAY. FOR ADMINISTERING VICTOZA AND LANTUS, DAILY*       CERTAVITE/ANTIOXIDANTS tablet Take 1 tablet by mouth daily       fluocinolone acetonide oil 0.01 % ear drops Place 5 Drops into both ears two times daily*       hydrocortisone 2.5 % cream Apply topically to rash in ears twice daily until rash resolves. Then use as needed for flares.       LANTUS SOLOSTAR 100 UNIT/ML soln Inject 24 units subcutaneous before bedtime.*       lisinopril (ZESTRIL) 40 MG tablet Take 1 Tablet (40 mg) by mouth once daily.*       metFORMIN (GLUCOPHAGE XR) 500 MG 24 hr tablet Take 4 Tablets (2,000 mg) by mouth once daily.*       nicotine (NICODERM CQ) 14 MG/24HR 24 hr patch APPLY 1-2 PATCHES TO CLEAN, DRY SKIN AND CHANGE DAILY*       OZEMPIC, 0.25 OR 0.5 MG/DOSE, 2 MG/3ML pen Inject 0.25 mg subcutaneously every 7 days.       rosuvastatin (CRESTOR) 20 MG tablet Take 1 Tablet (20 mg) by mouth once daily. Replaces simvastatin.*       SPIRIVA RESPIMAT 2.5 MCG/ACT inhaler Inhale 2 puffs into the lungs       VICTOZA PEN 18 MG/3ML soln Inject 0.3 mL (1.8 mg) subcutaneous once daily.*       No current facility-administered medications for this visit.        Social Habits:    Tobacco Use      Smoking status: Every Day        Types: Cigarettes        Passive exposure: Current (per pt)      Smokeless tobacco: Never       Alcohol Use: Not on file       History   Drug Use Unknown        Family History:  No family history on file.    Physical Examination:  Ht 1.6 m (5' 3\")   Wt 67.6 kg (149 lb)   BMI 26.39 kg/m    Wt Readings from Last 1 Encounters:   10/30/24 67.6 kg (149 lb)     Body mass index is 26.39 kg/m .    Exam:  No full examination as this was a " video visit however,   General: No apparent distress. Answers questions appropriately with no evidence of neurologic deficit on limited video.  Neurologic: move all extremities equally  Eyes: no obvious scleral abnormality  Skin: No exposed skin lesions on face or hands    Laboratory Findings:  Creatinine POCT   Date Value Ref Range Status   10/07/2024 0.7 0.5 - 1.0 mg/dL Final         Imaging:    I personally reviewed the CTA from 10/7/2024 and compared to that from 2/12/2024 which shows stable coral reef aortic plaque with stable luminal stenosis with minimal involvement of the right renal artery.      Impression/Shared Medical Decision Making:    #1- Asymptomatic paravisceral coral reef aortic plaque  #2- Hypertension, well-controlled on single-agent antihypertensive regiment (lisinopril 40 mg)  #3- COPD, not on home O2  #4- Type 2 diabetes mellitus, Hgb A1C 7.3% 1/12/2024  #5- Hyperlipidemia  #6- Bilateral hip pain, unclearly etiology however, likely arthritic hip pain  #7- Nicotine dependence, current smoker  Ms. Argueta is a pleasant 59 year old female seen for 6 month follow up of an atherosclerotic aortic plaque.   She has remained without lower extremity ischemia or renovascular hypertension.  She is on a single agent at this time.  We will continue to monitor for signs of ischemia.  Currently she has no indication for surgery.     I spent  3 minutes  today discussing the patients current cigarette dependence; the effects of smoking on their medical conditions and family; and a counseling plan for quitting. Part of our discussion included  The patient was agreeable to smoking cessation options. We also discussed risks of smoking, benefits of quitting, and total abstinence. The vascular group will follow up at the next visit to check the progress.    Ms. Argueta and her friend are in agreement with this plan as outlined.    Recommendations/Plan:  She remains asymptomatic and continues to have no indication  for surgical intervention  Recommend continuing optimal medical therapy with   Aspirin  High does statin therapy  Smoking cessation  Antihypertensives, well controlled on single agent     Chris Pandya MD  Vascular & Endovascular Surgery      15 minutes spent on the day of encounter doing chart review from our system and care everywhere, history and exam, documentation, coordinating care, and further activities as noted with over half spent counseling.        Again, thank you for allowing me to participate in the care of your patient.      Sincerely,    Chris Pandya MD

## 2024-11-16 ENCOUNTER — HEALTH MAINTENANCE LETTER (OUTPATIENT)
Age: 59
End: 2024-11-16

## 2025-03-08 ENCOUNTER — HEALTH MAINTENANCE LETTER (OUTPATIENT)
Age: 60
End: 2025-03-08

## 2025-04-02 ENCOUNTER — TRANSFERRED RECORDS (OUTPATIENT)
Dept: HEALTH INFORMATION MANAGEMENT | Facility: CLINIC | Age: 60
End: 2025-04-02
Payer: COMMERCIAL

## 2025-05-14 ENCOUNTER — TELEPHONE (OUTPATIENT)
Dept: VASCULAR SURGERY | Facility: CLINIC | Age: 60
End: 2025-05-14
Payer: COMMERCIAL

## 2025-05-14 NOTE — TELEPHONE ENCOUNTER
Left Voicemail (1st Attempt) and Sent AIMt (1st Attempt) for the patient to call back and schedule the following:Follow up Pre-transplant evaluation for kidney transplant     Location: [facility]  Provider: Dr. Chris Pandya  Appointment type: Return Vascular Patient  Appointment mode: In Person or Virtual Visit  Return date: October 2025    Specialty phone number: [phone] or [phone]    Referred to Vascular [facility]: No    Is Imaging Needed: Yes, CTA and Yes, US x2    Additional Notes: Please schedule imaging prior to Provider visit.    -Iggy Andrew on 5/14/2025 at 4:31 PM

## 2025-06-22 ENCOUNTER — HEALTH MAINTENANCE LETTER (OUTPATIENT)
Age: 60
End: 2025-06-22